# Patient Record
Sex: FEMALE | URBAN - METROPOLITAN AREA
[De-identification: names, ages, dates, MRNs, and addresses within clinical notes are randomized per-mention and may not be internally consistent; named-entity substitution may affect disease eponyms.]

---

## 2024-01-09 ENCOUNTER — TELEPHONE ENCOUNTER (OUTPATIENT)
Dept: URBAN - METROPOLITAN AREA CLINIC 113 | Facility: CLINIC | Age: 54
End: 2024-01-09

## 2024-01-26 ENCOUNTER — TELEPHONE ENCOUNTER (OUTPATIENT)
Dept: URBAN - METROPOLITAN AREA CLINIC 72 | Facility: CLINIC | Age: 54
End: 2024-01-26

## 2024-05-06 ENCOUNTER — OFFICE VISIT (OUTPATIENT)
Dept: URBAN - METROPOLITAN AREA MEDICAL CENTER 40 | Facility: MEDICAL CENTER | Age: 54
End: 2024-05-06
Payer: COMMERCIAL

## 2024-05-06 DIAGNOSIS — D12.5 ADENOMA OF SIGMOID COLON: ICD-10-CM

## 2024-05-06 DIAGNOSIS — D12.0 ADENOMA OF CECUM: ICD-10-CM

## 2024-05-06 DIAGNOSIS — Z12.11 COLON CANCER SCREENING: ICD-10-CM

## 2024-05-06 DIAGNOSIS — C18.4 ADENOCARCINOMA OF TRANSVERSE COLON: ICD-10-CM

## 2024-05-06 PROCEDURE — 45381 COLONOSCOPY SUBMUCOUS NJX: CPT | Performed by: INTERNAL MEDICINE

## 2024-05-06 PROCEDURE — 45385 COLONOSCOPY W/LESION REMOVAL: CPT | Performed by: INTERNAL MEDICINE

## 2024-05-08 ENCOUNTER — LAB OUTSIDE AN ENCOUNTER (OUTPATIENT)
Dept: URBAN - METROPOLITAN AREA CLINIC 72 | Facility: CLINIC | Age: 54
End: 2024-05-08

## 2024-05-08 ENCOUNTER — TELEPHONE ENCOUNTER (OUTPATIENT)
Dept: URBAN - METROPOLITAN AREA CLINIC 72 | Facility: CLINIC | Age: 54
End: 2024-05-08

## 2024-05-08 PROBLEM — 363408006: Status: ACTIVE | Noted: 2024-05-08

## 2024-05-09 ENCOUNTER — TELEPHONE ENCOUNTER (OUTPATIENT)
Dept: URBAN - METROPOLITAN AREA CLINIC 113 | Facility: CLINIC | Age: 54
End: 2024-05-09

## 2024-11-12 ENCOUNTER — DASHBOARD ENCOUNTERS (OUTPATIENT)
Age: 54
End: 2024-11-12

## 2024-11-12 ENCOUNTER — LAB OUTSIDE AN ENCOUNTER (OUTPATIENT)
Dept: URBAN - METROPOLITAN AREA CLINIC 72 | Facility: CLINIC | Age: 54
End: 2024-11-12

## 2024-11-12 ENCOUNTER — OFFICE VISIT (OUTPATIENT)
Dept: URBAN - METROPOLITAN AREA CLINIC 72 | Facility: CLINIC | Age: 54
End: 2024-11-12
Payer: COMMERCIAL

## 2024-11-12 VITALS
WEIGHT: 112.8 LBS | SYSTOLIC BLOOD PRESSURE: 100 MMHG | DIASTOLIC BLOOD PRESSURE: 64 MMHG | HEART RATE: 57 BPM | TEMPERATURE: 97.2 F | BODY MASS INDEX: 17.71 KG/M2 | HEIGHT: 67 IN

## 2024-11-12 DIAGNOSIS — K74.00 HEPATIC FIBROSIS: ICD-10-CM

## 2024-11-12 DIAGNOSIS — C18.4 MALIGNANT NEOPLASM OF TRANSVERSE COLON: ICD-10-CM

## 2024-11-12 DIAGNOSIS — R19.7 OVERFLOW DIARRHEA: ICD-10-CM

## 2024-11-12 PROBLEM — 62484002: Status: ACTIVE | Noted: 2024-11-12

## 2024-11-12 PROBLEM — 162106006: Status: ACTIVE | Noted: 2024-11-12

## 2024-11-12 PROCEDURE — 99204 OFFICE O/P NEW MOD 45 MIN: CPT | Performed by: INTERNAL MEDICINE

## 2024-11-12 NOTE — HPI-TODAY'S VISIT:
Patient is a 54-year-old female referred by Dr. Abhijit Shaw for concerns of hepatocellular disease.  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  Patient underwent screening colonoscopy 5/15/2024 and a moderately differentiated adenocarcinoma arising in fragmented tubulovillous adenoma with high-grade dysplasia was found in the transverse colon.  Patient underwent robotic assisted resection at [facility] on 6/18/2024.  Surgical pathology showed a grade 2 moderately differentiated adenocarcinoma measuring 2.2 cm in greatest dimension which invaded into the muscularis propria.  Operative report findings includes "fibrotic appearing liver".  Patient also sustained a bowel obstruction 2 months postoperatively requiring more colon resection. Post resection patient is having trouble with RUQ pain and "overflow diarrhea". She did require dulcolax for a period of time.   Patient drinks wine a couple of days a week. No tylenol. No HepA,B,C or vaccinated for them. No autoimmune conditions. Patient is not overweight. No FH of colon/GI cancer.

## 2024-11-12 NOTE — HPI-OTHER HISTORIES
Procedures: Colonoscopy-5/6/2024 ascending colon polyp. Transverse colon polyp (malignant appearance) . Sigmoid colon polyp. Path: Ascending colon polyp was a tubular adenoma. Transverse colon polyp was moderately differentiated adenocarcinoma arising in fragmented tubulovillous adenoma with high-grade dysplasia. Sigmoid colon polyp was a tubulovillous adenoma. Recall 1 year. Due 5/2025.  DI: 5/15/2024-CT abdomen pelvis: No acute finding, evidence of malignancy or metastatic disease within the chest, abdomen, or pelvis. Scant, nonspecific free fluid in the pelvis.  Labs: 5/15/2024-BUN 18, creatinine 0.82

## 2024-12-10 ENCOUNTER — LAB OUTSIDE AN ENCOUNTER (OUTPATIENT)
Dept: URBAN - METROPOLITAN AREA CLINIC 72 | Facility: CLINIC | Age: 54
End: 2024-12-10

## 2024-12-10 ENCOUNTER — OFFICE VISIT (OUTPATIENT)
Dept: URBAN - METROPOLITAN AREA CLINIC 72 | Facility: CLINIC | Age: 54
End: 2024-12-10
Payer: COMMERCIAL

## 2024-12-10 VITALS
TEMPERATURE: 97.9 F | SYSTOLIC BLOOD PRESSURE: 118 MMHG | BODY MASS INDEX: 18.27 KG/M2 | WEIGHT: 116.4 LBS | HEIGHT: 67 IN | DIASTOLIC BLOOD PRESSURE: 66 MMHG

## 2024-12-10 DIAGNOSIS — R19.7 OVERFLOW DIARRHEA: ICD-10-CM

## 2024-12-10 DIAGNOSIS — K74.00 HEPATIC FIBROSIS: ICD-10-CM

## 2024-12-10 DIAGNOSIS — C18.4 MALIGNANT NEOPLASM OF TRANSVERSE COLON: ICD-10-CM

## 2024-12-10 PROCEDURE — 99214 OFFICE O/P EST MOD 30 MIN: CPT

## 2024-12-10 NOTE — HPI-OTHER HISTORIES
Procedures: Colonoscopy-5/6/2024 ascending colon polyp. Transverse colon polyp (malignant appearance) . Sigmoid colon polyp. Path: Ascending colon polyp was a tubular adenoma. Transverse colon polyp was moderately differentiated adenocarcinoma arising in fragmented tubulovillous adenoma with high-grade dysplasia. Sigmoid colon polyp was a tubulovillous adenoma. Recall 1 year. Due 5/2025.  DI: 5/15/2024-CT abdomen pelvis: No acute finding, evidence of malignancy or metastatic disease within the chest, abdomen, or pelvis. Scant, nonspecific free fluid in the pelvis.  10/18/2024- Abdominal ultrasound right upper quadrant: Liver appearance heterogeneous echogenicity. No evidence of metastatic disease. There is a small hyperechoic area in the right lobe measuring 5.4 x 5.2 x 4.8 mm compatible with a small hemangioma. No evidence of liver metastasis. Pancreas is well-visualized and unremarkable. Bile ducts unremarkable, gallbladder unremarkable. No evidence of liver metastasis. Trace amount of ascites. Elastography corresponds with no to mild fibrosis. In the absence of other known clinical signs, rules out compensated chronic liver disease. If there are no clinical signs, may need further test for confirmation.  Labs: 5/15/2024-BUN 18, creatinine 0.82

## 2024-12-10 NOTE — HPI-TODAY'S VISIT:
Patient is a 54-year-old female last seen in the office on 11/12/2024 for hepatic fibrosis, overflow diarrhea, and history of colon cancer.  When patient had her colon resection surgeon noted a fibrotic looking liver, so we did labs and imaging to ensure there is no liver damage.  Labs and abdominal ultrasound were requested from Dr. Shaw's office.  Abdominal ultrasound showed heterogeneous echogenicity of the liver compatible with underlying hepatocellular disease.  However, elastography showed mild to no fibrosis with a METAVIR F1. Labs that were received are unreadable.  Patient will need for a liver workup as last step of workup.  If there is further concern MRI liver protocol or liver biopsy could be ordered.  Patient is seen today with no issues. We reviewed her abd US with elastography. Discussed options: fo LFTs if normal - no further workup; Do complete liver workup and make sure everything is negative, do MRI or possible liver bx. Patient also spoke to one of her previous surgeons and he did not note any liver damage during surgery.   Patient has started mirala xdaily and overflow diarrhea has stopped.

## 2025-05-05 ENCOUNTER — OFFICE VISIT (OUTPATIENT)
Dept: URBAN - METROPOLITAN AREA MEDICAL CENTER 40 | Facility: MEDICAL CENTER | Age: 55
End: 2025-05-05
Payer: COMMERCIAL

## 2025-05-05 DIAGNOSIS — D12.4 ADENOMA OF DESCENDING COLON: ICD-10-CM

## 2025-05-05 DIAGNOSIS — Z08 ENCOUNTER FOR FOLLOW-UP EXAMINATION AFTER COMPLETED TREATMENT FOR MALIGNANT NEOPLASM: ICD-10-CM

## 2025-05-05 DIAGNOSIS — D12.2 ADENOMA OF ASCENDING COLON: ICD-10-CM

## 2025-05-05 DIAGNOSIS — Z85.038 PERSONAL HISTORY OF OTHER MALIGNANT NEOPLASM OF LARGE INTESTINE: ICD-10-CM

## 2025-05-05 PROCEDURE — 45385 COLONOSCOPY W/LESION REMOVAL: CPT | Performed by: INTERNAL MEDICINE

## 2025-05-05 PROCEDURE — 45380 COLONOSCOPY AND BIOPSY: CPT | Performed by: INTERNAL MEDICINE

## 2025-05-12 ENCOUNTER — TELEPHONE ENCOUNTER (OUTPATIENT)
Dept: URBAN - METROPOLITAN AREA CLINIC 72 | Facility: CLINIC | Age: 55
End: 2025-05-12

## 2025-05-27 ENCOUNTER — OFFICE VISIT (OUTPATIENT)
Dept: URBAN - METROPOLITAN AREA CLINIC 72 | Facility: CLINIC | Age: 55
End: 2025-05-27

## 2025-07-08 ENCOUNTER — OFFICE VISIT (OUTPATIENT)
Dept: URBAN - METROPOLITAN AREA CLINIC 72 | Facility: CLINIC | Age: 55
End: 2025-07-08